# Patient Record
(demographics unavailable — no encounter records)

---

## 2024-12-27 NOTE — PHYSICAL EXAM
[Normal] : normal rate, regular rhythm, normal S1 and S2 and no murmur heard [de-identified] : Scattered rhonchi occasional wheeze [de-identified] : See H&P

## 2024-12-27 NOTE — ASSESSMENT
[FreeTextEntry1] : Recommend over-the-counter medications for URI.  COVID PCR obtained  Seborrheic keratosis see H&P

## 2024-12-27 NOTE — HISTORY OF PRESENT ILLNESS
[FreeTextEntry8] : 3 days ago began having cough producing brown phlegm with chest congestion some wheezing.  No fever no shortness of breath now symptoms are 75% better.  Exposed to grandson who is constantly having upper respiratory tract problems  Pruritic papules on back has been diagnosed with seborrheic keratosis and using ammonium lactate with partial response.  Poor sleep at night due to itching.  Physical exam scattered erythematous papules on back  prescription for Synalar lotion given.  Recommend Benadryl 25 to 50 mg at bedtime plus or minus Benadryl cream

## 2025-07-08 NOTE — PHYSICAL EXAM
[No Acute Distress] : no acute distress [Well-Appearing] : well-appearing [Normal Sclera/Conjunctiva] : normal sclera/conjunctiva [PERRL] : pupils equal round and reactive to light [EOMI] : extraocular movements intact [Normal Outer Ear/Nose] : the outer ears and nose were normal in appearance [Normal TMs] : both tympanic membranes were normal [Supple] : supple [No Respiratory Distress] : no respiratory distress  [No Accessory Muscle Use] : no accessory muscle use [Clear to Auscultation] : lungs were clear to auscultation bilaterally [Normal Rate] : normal rate  [Regular Rhythm] : with a regular rhythm [de-identified] : + PND and erythema [de-identified] : +lymphadenopathy

## 2025-07-08 NOTE — REVIEW OF SYSTEMS
[Postnasal Drip] : postnasal drip [Hoarseness] : hoarseness [Cough] : cough [Fever] : no fever [Chills] : no chills [Earache] : no earache [Nasal Discharge] : no nasal discharge [Sore Throat] : no sore throat [Chest Pain] : no chest pain [Palpitations] : no palpitations [Shortness Of Breath] : no shortness of breath [Wheezing] : no wheezing [Headache] : no headache [Dizziness] : no dizziness

## 2025-07-08 NOTE — PLAN
[FreeTextEntry1] : Sinusitis - advised likely viral - trial of medrol hayden - advised if no improvement can then take abx but will send now - can take mucinex PRN

## 2025-07-08 NOTE — HISTORY OF PRESENT ILLNESS
[FreeTextEntry8] : 86 year old male presents for laryngitis and cough for 1 day. Concerned as he recently had cardioversion and is going through sleep studies. Would like to make sure infection does not worsen.